# Patient Record
Sex: MALE
[De-identification: names, ages, dates, MRNs, and addresses within clinical notes are randomized per-mention and may not be internally consistent; named-entity substitution may affect disease eponyms.]

---

## 2017-05-22 PROBLEM — Z83.3 FAMILY HISTORY OF DIABETES MELLITUS: Chronic | Status: ACTIVE | Noted: 2017-05-22

## 2018-08-24 ENCOUNTER — CHARTING TRANS (OUTPATIENT)
Dept: OTHER | Age: 23
End: 2018-08-24

## 2018-08-24 ENCOUNTER — LAB SERVICES (OUTPATIENT)
Dept: OTHER | Age: 23
End: 2018-08-24

## 2018-08-24 LAB — RAPID STREP GROUP A: NORMAL

## 2018-08-28 LAB — CULTURE STREP GRP A (STTH) HL: NORMAL

## 2018-11-29 ENCOUNTER — HOSPITAL ENCOUNTER (OUTPATIENT)
Age: 23
Discharge: HOME | End: 2018-11-29
Attending: EMERGENCY MEDICINE
Payer: COMMERCIAL

## 2018-11-29 VITALS
DIASTOLIC BLOOD PRESSURE: 79 MMHG | TEMPERATURE: 98 F | HEART RATE: 67 BPM | RESPIRATION RATE: 18 BRPM | OXYGEN SATURATION: 99 % | SYSTOLIC BLOOD PRESSURE: 128 MMHG

## 2018-11-29 DIAGNOSIS — H00.014 HORDEOLUM EXTERNUM OF LEFT UPPER EYELID: Primary | ICD-10-CM

## 2018-11-29 PROCEDURE — 99214 OFFICE O/P EST MOD 30 MIN: CPT

## 2018-11-29 PROCEDURE — 99213 OFFICE O/P EST LOW 20 MIN: CPT

## 2018-11-29 RX ORDER — ERYTHROMYCIN 5 MG/G
1 OINTMENT OPHTHALMIC 2 TIMES DAILY
Qty: 1 G | Refills: 0 | Status: SHIPPED | OUTPATIENT
Start: 2018-11-29 | End: 2018-12-06

## 2018-11-29 NOTE — ED NOTES
Wash hands before and after eye medication works as  and encouraged to wipe eyes clean after work with clean hands or washcloth and follow up with opto as needed.

## 2018-11-29 NOTE — ED PROVIDER NOTES
Patient Seen in: [facility] In [place]    History   No chief complaint on file. Stated Complaint: lt eyelid pain    HPI    Left eyelid pain and swelling today. Some eye redness and irritation. No vision change.      No past medic diagnosis)    Disposition:  Discharge  11/29/2018  3:41 pm    Follow-up:  Angi Crocker MD  [address]  [phone]    In 1 week          Medications Prescribed:  Current Discharge Medication List    START ta

## 2018-11-29 NOTE — ED INITIAL ASSESSMENT (HPI)
Thinks he has a stye in let eye since yesterday using another eye meds for stye in rt eye he got from [facility]

## 2018-12-08 VITALS
HEART RATE: 76 BPM | RESPIRATION RATE: 16 BRPM | SYSTOLIC BLOOD PRESSURE: 102 MMHG | TEMPERATURE: 98.1 F | DIASTOLIC BLOOD PRESSURE: 74 MMHG | OXYGEN SATURATION: 99 %

## 2019-01-02 ENCOUNTER — HOSPITAL ENCOUNTER (OUTPATIENT)
Age: 24
Discharge: HOME | End: 2019-01-02
Attending: EMERGENCY MEDICINE
Payer: COMMERCIAL

## 2019-01-02 VITALS
OXYGEN SATURATION: 100 % | WEIGHT: 145 LBS | HEIGHT: 69 IN | HEART RATE: 79 BPM | RESPIRATION RATE: 18 BRPM | SYSTOLIC BLOOD PRESSURE: 112 MMHG | TEMPERATURE: 98 F | DIASTOLIC BLOOD PRESSURE: 69 MMHG | BODY MASS INDEX: 21.48 KG/M2

## 2019-01-02 DIAGNOSIS — H00.024 HORDEOLUM INTERNUM OF LEFT UPPER EYELID: ICD-10-CM

## 2019-01-02 DIAGNOSIS — H10.12 ACUTE ATOPIC CONJUNCTIVITIS OF LEFT EYE: Primary | ICD-10-CM

## 2019-01-02 PROCEDURE — 99213 OFFICE O/P EST LOW 20 MIN: CPT

## 2019-01-02 RX ORDER — TOBRAMYCIN AND DEXAMETHASONE 3; 1 MG/ML; MG/ML
2 SUSPENSION/ DROPS OPHTHALMIC 4 TIMES DAILY
Qty: 1 BOTTLE | Refills: 0 | Status: SHIPPED | OUTPATIENT
Start: 2019-01-02 | End: 2019-01-07

## 2019-01-02 NOTE — ED PROVIDER NOTES
Patient Seen in: [facility] In [address] [place] [address]    History   Patient presents with:   Eye Visual Problem (opthalmic)    Stated Complaint: eye pain    HPI    The patient is a 21-year-old male with 2 days of left eye irritation redness itching Eyes: EOM are normal. Pupils are equal, round, and reactive to light. Lids are everted and swept, no foreign bodies found. Left eye exhibits hordeolum. No foreign body present in the left eye. Left conjunctiva is injected.  Left conjunctiva has no hemorrhag